# Patient Record
(demographics unavailable — no encounter records)

---

## 2024-12-03 NOTE — DATA REVIEWED
[de-identified] :   MR Head No Cont             Final  No Documents Attached    	 EXAM: [id number] - MR BRAIN  - ORDERED BY: YENNY RUBALCAVA   PROCEDURE DATE:  02/24/2024    INTERPRETATION:  CLINICAL INDICATION: Disorientation/amnesia.  TECHNIQUE: MRI of the brain was performed without contrast.  COMPARISON: None  FINDINGS: There is no evidence of acute infarct, acute intracranial hemorrhage, mass effect or hydrocephalus. No extra-axial collection. Basal cisterns are patent.  No brain parenchymal signal abnormality.  Ventricles and sulci are age-appropriate in size.  Major intracranial flow-voids are preserved.  The orbits, sellar and suprasellar structures, and craniocervical junction are unremarkable. Visualized paranasal sinuses and mastoid air cells are clear.  IMPRESSION: Normal exam.  [de-identified] : Awake, drowsy and asleep EEG.    Signatures     Electronically signed by : YENNY RUBALCAVA MD; Mar  8 2024  5:08PM Eastern Standard Time (Author)

## 2024-12-03 NOTE — DISCUSSION/SUMMARY
[FreeTextEntry1] : 28-year-old man right-handed history of attention deficit disorder, initial good response to the Adderall, and release 25 mg the morning, has noticed the afternoon his attention, ability to perform as well lags.  Reviewed and discussed options. Plan: Will renew Adderall extended release 25 mg in the morning. Add Adderall 20 mg, immediate release in the early afternoon. Patient will call for refills as needed. Return to office, 6 months.

## 2024-12-03 NOTE — HISTORY OF PRESENT ILLNESS
[FreeTextEntry1] : 28-year-old man right-handed with a history of difficulty with attention concentration.  Here with his wife.  Last time seen in the officeAugust of this year.  Patient prescribed Adderall extended release 25 mg in the morning which usually takes around 6 AM, the effects usually last about midday, and after that he starts getting more inattentive.  A prescription for the Adderall immediate release 20 mg was sent but he said never took it. History of sleep apnea, on CPAP.

## 2025-01-14 NOTE — PHYSICAL EXAM
[General Appearance - Well Developed] : well developed [General Appearance - Well Nourished] : well nourished [General Appearance - In No Acute Distress] : no acute distress [] : no respiratory distress [Urethral Meatus] : meatus normal [Penis Abnormality] : normal uncircumcised penis [Scrotum] : the scrotum was normal [Epididymis] : the epididymides were normal [Testes Tenderness] : no tenderness of the testes [Testes Mass (___cm)] : there were no testicular masses [Oriented To Time, Place, And Person] : oriented to person, place, and time

## 2025-07-25 NOTE — PHYSICAL EXAM
[General Appearance - Alert] : alert [General Appearance - In No Acute Distress] : in no acute distress [General Appearance - Well Developed] : well developed [General Appearance - Well Nourished] : well nourished [General Appearance - Well-Appearing] : healthy appearing [] : normal voice and communication [Oriented To Time, Place, And Person] : oriented to person, place, and time [Impaired Insight] : insight and judgment were intact [Affect] : the affect was normal [Mood] : the mood was normal [Memory Recent] : recent memory was not impaired [Memory Remote] : remote memory was not impaired

## 2025-07-25 NOTE — DISCUSSION/SUMMARY
[FreeTextEntry1] : 28-year-old right-handed with a history of attention deficit disorder, doing well on present dose of medication. Reviewed and discussed treatment, at this time no changes. Patient will call for refills as needed. Return to office, 6 months.

## 2025-07-25 NOTE — REASON FOR VISIT
[Home] : at home, [unfilled] , at the time of the visit. [Medical Office: ([facility])___] : at the medical office located in  [Telehealth (audio & video)] : This visit was provided via telehealth using real-time 2-way audio visual technology. [Verbal consent obtained from patient] : the patient, [unfilled] [Follow-Up: _____] : a [unfilled] follow-up visit

## 2025-07-25 NOTE — REASON FOR VISIT
[Home] : at home, [unfilled] , at the time of the visit. [Medical Office: ([facility])___] : at the medical office located in  [Telehealth (audio & video)] : This visit was provided via telehealth using real-time 2-way audio visual technology. [Verbal consent obtained from patient] : the patient, [unfilled] [Follow-Up: _____] : a [unfilled] follow-up visit

## 2025-07-25 NOTE — HISTORY OF PRESENT ILLNESS
[FreeTextEntry1] : 28-year-old man right-handed with a history of attention deficit disorder, complains of memory difficulties here for follow-up visit.  Last time seen in the office, December 2024.  Denies any many new medical issues or complaints, no new medications.  To be feeling well.  Reports medications that be presently prescribed, the Adderall extended release 25 mg in the morning and the Adderall immediate release to 20 mg in the afternoon works well.  Helping focus, attention. Tolerates the medication well, has only noticed dry mouth, but no trouble with sleep, no mood changes, no headache, no chest pain or palpitations.